# Patient Record
Sex: FEMALE | Race: BLACK OR AFRICAN AMERICAN
[De-identification: names, ages, dates, MRNs, and addresses within clinical notes are randomized per-mention and may not be internally consistent; named-entity substitution may affect disease eponyms.]

---

## 2019-10-18 ENCOUNTER — HOSPITAL ENCOUNTER (EMERGENCY)
Dept: HOSPITAL 18 - NAV ERS | Age: 54
Discharge: HOME | End: 2019-10-18
Payer: SELF-PAY

## 2019-10-18 DIAGNOSIS — M19.012: ICD-10-CM

## 2019-10-18 DIAGNOSIS — S46.912A: Primary | ICD-10-CM

## 2019-10-18 DIAGNOSIS — D72.820: ICD-10-CM

## 2019-10-18 DIAGNOSIS — R51: ICD-10-CM

## 2019-10-18 DIAGNOSIS — R07.9: ICD-10-CM
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
## 2019-10-18 LAB
ALBUMIN SERPL BCG-MCNC: 4.2 G/DL (ref 3.5–5)
ALP SERPL-CCNC: 82 U/L (ref 40–110)
ALT SERPL W P-5'-P-CCNC: 23 U/L (ref 8–55)
ANION GAP SERPL CALC-SCNC: 16 MMOL/L (ref 10–20)
AST SERPL-CCNC: 24 U/L (ref 5–34)
BACTERIA UR QL AUTO: (no result) HPF
BILIRUB SERPL-MCNC: 0.3 MG/DL (ref 0.2–1.2)
BUN SERPL-MCNC: 11 MG/DL (ref 9.8–20.1)
CALCIUM SERPL-MCNC: 9.3 MG/DL (ref 7.8–10.44)
CHLORIDE SERPL-SCNC: 103 MMOL/L (ref 98–107)
CO2 SERPL-SCNC: 23 MMOL/L (ref 22–29)
CREAT CL PREDICTED SERPL C-G-VRATE: 0 ML/MIN (ref 70–130)
GLOBULIN SER CALC-MCNC: 3.3 G/DL (ref 2.4–3.5)
GLUCOSE SERPL-MCNC: 80 MG/DL (ref 70–105)
HGB BLD-MCNC: 13.5 G/DL (ref 12–16)
MCH RBC QN AUTO: 26.1 PG (ref 27–31)
MCV RBC AUTO: 86.5 FL (ref 78–98)
MDIFF COMPLETE?: YES
PLATELET # BLD AUTO: 224 THOU/UL (ref 130–400)
POTASSIUM SERPL-SCNC: 3.8 MMOL/L (ref 3.5–5.1)
RBC # BLD AUTO: 5.16 MILL/UL (ref 4.2–5.4)
RBC UR QL AUTO: (no result) HPF (ref 0–3)
SODIUM SERPL-SCNC: 138 MMOL/L (ref 136–145)
WBC # BLD AUTO: 6.8 THOU/UL (ref 4.8–10.8)
WBC UR QL AUTO: (no result) HPF (ref 0–3)

## 2019-10-18 PROCEDURE — 80053 COMPREHEN METABOLIC PANEL: CPT

## 2019-10-18 PROCEDURE — 85025 COMPLETE CBC W/AUTO DIFF WBC: CPT

## 2019-10-18 PROCEDURE — 81015 MICROSCOPIC EXAM OF URINE: CPT

## 2019-10-18 PROCEDURE — 84484 ASSAY OF TROPONIN QUANT: CPT

## 2019-10-18 PROCEDURE — 81003 URINALYSIS AUTO W/O SCOPE: CPT

## 2019-10-18 PROCEDURE — 93005 ELECTROCARDIOGRAM TRACING: CPT

## 2019-10-18 PROCEDURE — 71046 X-RAY EXAM CHEST 2 VIEWS: CPT

## 2019-10-18 PROCEDURE — 36415 COLL VENOUS BLD VENIPUNCTURE: CPT

## 2019-10-18 NOTE — RAD
PA AND LATERAL VIEWS OF THE CHEST:

 

HISTORY: 

Chest pain.

 

FINDINGS 

The heart size is normal.  The lungs are expanded without focal areas of consolidation, pneumothorace
s, or pleural effusions.  No acute osseous abnormalities are seen.

 

IMPRESSION: 

No acute process.

 

POS: [facility]

## 2019-10-18 NOTE — RAD
LEFT SHOULDER 3 VIEWS:

 

Date:  10/18/19 

 

HISTORY:  

Left shoulder pain. 

 

FINDINGS/IMPRESSION: 

There are degenerative changes in the acromioclavicular joint. No acute fracture, dislocation, or bon
y destruction is seen. 

 

 

POS: [facility]

## 2019-10-21 ENCOUNTER — HOSPITAL ENCOUNTER (EMERGENCY)
Dept: HOSPITAL 18 - NAV ERS | Age: 54
Discharge: HOME | End: 2019-10-21
Payer: SELF-PAY

## 2019-10-21 DIAGNOSIS — M25.562: ICD-10-CM

## 2019-10-21 DIAGNOSIS — M25.561: Primary | ICD-10-CM

## 2019-10-21 DIAGNOSIS — Z79.899: ICD-10-CM

## 2019-10-21 PROCEDURE — 99281 EMR DPT VST MAYX REQ PHY/QHP: CPT
